# Patient Record
Sex: FEMALE | Race: BLACK OR AFRICAN AMERICAN | Employment: FULL TIME | URBAN - METROPOLITAN AREA
[De-identification: names, ages, dates, MRNs, and addresses within clinical notes are randomized per-mention and may not be internally consistent; named-entity substitution may affect disease eponyms.]

---

## 2017-02-21 PROCEDURE — 88305 TISSUE EXAM BY PATHOLOGIST: CPT | Performed by: OBSTETRICS & GYNECOLOGY

## 2017-02-22 ENCOUNTER — HOSPITAL ENCOUNTER (OUTPATIENT)
Dept: LAB | Age: 32
Discharge: HOME | End: 2017-02-22

## 2017-02-27 NOTE — PROGRESS NOTES
Patient notified of BX results and lab results. She will just need to repeat her pap smear in August. Appt made and she can call back if needed.

## 2017-11-07 PROCEDURE — 88305 TISSUE EXAM BY PATHOLOGIST: CPT | Performed by: OBSTETRICS & GYNECOLOGY

## 2017-11-08 ENCOUNTER — HOSPITAL ENCOUNTER (OUTPATIENT)
Dept: LAB | Age: 32
Discharge: HOME | End: 2017-11-08

## 2017-11-13 NOTE — PROGRESS NOTES
Patient notified of path results and she needs a LEEP. Appt made [email or web address]. Advised to take Advil prior to procedure.

## 2018-07-30 PROCEDURE — 88307 TISSUE EXAM BY PATHOLOGIST: CPT | Performed by: OBSTETRICS & GYNECOLOGY

## 2018-07-31 ENCOUNTER — HOSPITAL ENCOUNTER (OUTPATIENT)
Dept: LAB | Age: 33
Discharge: HOME | End: 2018-07-31

## 2019-01-29 ENCOUNTER — HOSPITAL ENCOUNTER (EMERGENCY)
Age: 34
Discharge: HOME | End: 2019-01-29
Payer: COMMERCIAL

## 2019-01-29 VITALS
TEMPERATURE: 98.4 F | WEIGHT: 182 LBS | OXYGEN SATURATION: 99 % | HEART RATE: 76 BPM | HEIGHT: 67 IN | SYSTOLIC BLOOD PRESSURE: 99 MMHG | DIASTOLIC BLOOD PRESSURE: 56 MMHG | BODY MASS INDEX: 28.56 KG/M2 | RESPIRATION RATE: 18 BRPM

## 2019-01-29 DIAGNOSIS — R07.89 ATYPICAL CHEST PAIN: ICD-10-CM

## 2019-01-29 DIAGNOSIS — F43.9 STRESS AT HOME: Primary | ICD-10-CM

## 2019-01-29 LAB
ALBUMIN SERPL-MCNC: 3.6 G/DL (ref 3.5–5)
ALBUMIN/GLOB SERPL: 0.8 {RATIO} (ref 1.2–3.5)
ALP SERPL-CCNC: 68 U/L (ref 50–136)
ALT SERPL-CCNC: 19 U/L (ref 12–65)
ANION GAP SERPL CALC-SCNC: 8 MMOL/L (ref 7–16)
AST SERPL-CCNC: 14 U/L (ref 15–37)
ATRIAL RATE: 97 BPM
BASOPHILS # BLD: 0 K/UL (ref 0–0.2)
BASOPHILS NFR BLD: 0 % (ref 0–2)
BILIRUB SERPL-MCNC: 0.7 MG/DL (ref 0.2–1.1)
BUN SERPL-MCNC: 9 MG/DL (ref 6–23)
CALCIUM SERPL-MCNC: 8.9 MG/DL (ref 8.3–10.4)
CALCULATED P AXIS, ECG09: 75 DEGREES
CALCULATED R AXIS, ECG10: 59 DEGREES
CALCULATED T AXIS, ECG11: 28 DEGREES
CHLORIDE SERPL-SCNC: 104 MMOL/L (ref 98–107)
CO2 SERPL-SCNC: 26 MMOL/L (ref 21–32)
CREAT SERPL-MCNC: 0.91 MG/DL (ref 0.6–1)
D DIMER PPP FEU-MCNC: <0.27 UG/ML(FEU)
DIAGNOSIS, 93000: NORMAL
DIFFERENTIAL METHOD BLD: NORMAL
EOSINOPHIL # BLD: 0.1 K/UL (ref 0–0.8)
EOSINOPHIL NFR BLD: 2 % (ref 0.5–7.8)
ERYTHROCYTE [DISTWIDTH] IN BLOOD BY AUTOMATED COUNT: 12 % (ref 11.9–14.6)
GLOBULIN SER CALC-MCNC: 4.5 G/DL (ref 2.3–3.5)
GLUCOSE SERPL-MCNC: 109 MG/DL (ref 65–100)
HCT VFR BLD AUTO: 41 % (ref 35.8–46.3)
HGB BLD-MCNC: 14.1 G/DL (ref 11.7–15.4)
IMM GRANULOCYTES # BLD AUTO: 0 K/UL (ref 0–0.5)
IMM GRANULOCYTES NFR BLD AUTO: 1 % (ref 0–5)
LYMPHOCYTES # BLD: 1.8 K/UL (ref 0.5–4.6)
LYMPHOCYTES NFR BLD: 23 % (ref 13–44)
MCH RBC QN AUTO: 32.3 PG (ref 26.1–32.9)
MCHC RBC AUTO-ENTMCNC: 34.4 G/DL (ref 31.4–35)
MCV RBC AUTO: 93.8 FL (ref 79.6–97.8)
MONOCYTES # BLD: 0.5 K/UL (ref 0.1–1.3)
MONOCYTES NFR BLD: 7 % (ref 4–12)
NEUTS SEG # BLD: 5.3 K/UL (ref 1.7–8.2)
NEUTS SEG NFR BLD: 68 % (ref 43–78)
NRBC # BLD: 0 K/UL (ref 0–0.2)
P-R INTERVAL, ECG05: 144 MS
PLATELET # BLD AUTO: 189 K/UL (ref 150–450)
PMV BLD AUTO: 11.6 FL (ref 9.4–12.3)
POTASSIUM SERPL-SCNC: 3.9 MMOL/L (ref 3.5–5.1)
PROT SERPL-MCNC: 8.1 G/DL (ref 6.3–8.2)
Q-T INTERVAL, ECG07: 346 MS
QRS DURATION, ECG06: 84 MS
QTC CALCULATION (BEZET), ECG08: 439 MS
RBC # BLD AUTO: 4.37 M/UL (ref 4.05–5.2)
SODIUM SERPL-SCNC: 138 MMOL/L (ref 136–145)
TROPONIN I BLD-MCNC: 0 NG/ML (ref 0.02–0.05)
TROPONIN I BLD-MCNC: 0.02 NG/ML (ref 0.02–0.05)
VENTRICULAR RATE, ECG03: 97 BPM
WBC # BLD AUTO: 7.8 K/UL (ref 4.3–11.1)

## 2019-01-29 PROCEDURE — 99284 EMERGENCY DEPT VISIT MOD MDM: CPT

## 2019-01-29 PROCEDURE — 85025 COMPLETE CBC W/AUTO DIFF WBC: CPT

## 2019-01-29 PROCEDURE — 84484 ASSAY OF TROPONIN QUANT: CPT

## 2019-01-29 PROCEDURE — 93005 ELECTROCARDIOGRAM TRACING: CPT | Performed by: EMERGENCY MEDICINE

## 2019-01-29 PROCEDURE — 85379 FIBRIN DEGRADATION QUANT: CPT

## 2019-01-29 PROCEDURE — 80053 COMPREHEN METABOLIC PANEL: CPT

## 2019-01-29 RX ORDER — ALPRAZOLAM 1 MG/1
0.5 TABLET ORAL
Qty: 8 TAB | Refills: 0 | Status: SHIPPED | OUTPATIENT
Start: 2019-01-29 | End: 2019-02-06 | Stop reason: ALTCHOICE

## 2019-01-29 NOTE — ED NOTES
I have reviewed discharge instructions with the patient. The patient verbalized understanding. Patient left ED via Discharge Method: ambulatory to Home with self. Opportunity for questions and clarification provided. Patient given 0 scripts. To continue your aftercare when you leave the hospital, you may receive an automated call from our care team to check in on how you are doing. This is a free service and part of our promise to provide the best care and service to meet your aftercare needs.  If you have questions, or wish to unsubscribe from this service please call [phone]. Thank you for Choosing our [facility] Emergency Department.

## 2019-01-29 NOTE — ED PROVIDER NOTES
31-year-old female complaining of chest pressure. Patient has been under a lot of stress at home lately. Patient was at work today and continued to have chest pain. This is been going on all for several weeks. The history is provided by the patient. Chest Pain This is a new problem. The current episode started more than 2 days ago. The problem has not changed since onset. The pain is associated with stress. The pain is mild. The quality of the pain is described as heavy. The pain does not radiate. Pertinent negatives include no diaphoresis and no shortness of breath. She has tried nothing for the symptoms. Past Medical History:  
Diagnosis Date  Abnormal Pap smear of cervix LGSIL Past Surgical History:  
Procedure Laterality Date  HX COLPOSCOPY    
 2017 (LGSIL) Family History:  
Problem Relation Age of Onset  Prostate Cancer Maternal Grandfather  Prostate Cancer Paternal Grandfather Social History Socioeconomic History  Marital status: SINGLE Spouse name: Not on file  Number of children: Not on file  Years of education: Not on file  Highest education level: Not on file Social Needs  Financial resource strain: Not on file  Food insecurity - worry: Not on file  Food insecurity - inability: Not on file  Transportation needs - medical: Not on file  Transportation needs - non-medical: Not on file Occupational History  Not on file Tobacco Use  Smoking status: Never Smoker  Smokeless tobacco: Never Used Substance and Sexual Activity  Alcohol use: Yes Comment: occasionally  Drug use: No  
 Sexual activity: Yes Birth control/protection: IUD Comment: mirena 2015 Other Topics Concern [address] Service Not Asked  Blood Transfusions Not Asked  Caffeine Concern Yes  Occupational Exposure Not Asked [place] Hazards Not Asked  Sleep Concern Not Asked  Stress Concern Not Asked  Weight Concern Not Asked  Special Diet Not Asked  Back Care Not Asked  Exercise No  
 Bike Helmet Not Asked [address] [facility] Yes  Self-Exams No  
Social History Narrative Denies any sexual or physical abuse and feels safe at home. ALLERGIES: Patient has no known allergies. Review of Systems Constitutional: Negative. Negative for activity change and diaphoresis. HENT: Negative. Eyes: Negative. Respiratory: Negative. Negative for shortness of breath. Cardiovascular: Positive for chest pain. Gastrointestinal: Negative. Genitourinary: Negative. Musculoskeletal: Negative. Skin: Negative. Neurological: Negative. Psychiatric/Behavioral: Negative. All other systems reviewed and are negative. Vitals:  
 01/29/19 1132 BP: 127/76 Pulse: 90 Resp: 18 Temp: 98.4 °F (36.9 °C) SpO2: 98% Weight: 82.6 kg (182 lb) Height: 5' 7\" (1.702 m) Physical Exam  
Constitutional: She is oriented to person, place, and time. She appears well-developed and well-nourished. No distress. HENT:  
Head: Normocephalic and atraumatic. Right Ear: External ear normal.  
Left Ear: External ear normal.  
Nose: Nose normal.  
Mouth/Throat: Oropharynx is clear and moist. No oropharyngeal exudate. Eyes: Conjunctivae and EOM are normal. Pupils are equal, round, and reactive to light. Right eye exhibits no discharge. Left eye exhibits no discharge. No scleral icterus. Neck: Normal range of motion. Neck supple. No JVD present. No tracheal deviation present. Cardiovascular: Normal rate, regular rhythm and intact distal pulses. Pulmonary/Chest: Effort normal and breath sounds normal. No stridor. No respiratory distress. She has no wheezes. She exhibits no tenderness. Abdominal: Soft. Bowel sounds are normal. She exhibits no distension and no mass. There is no tenderness. Musculoskeletal: Normal range of motion. She exhibits no edema or tenderness. Neurological: She is alert and oriented to person, place, and time. No cranial nerve deficit. Skin: Skin is warm and dry. No rash noted. She is not diaphoretic. No erythema. No pallor. Psychiatric: She has a normal mood and affect. Her behavior is normal. Thought content normal.  
Nursing note and vitals reviewed. MDM Number of Diagnoses or Management Options Diagnosis management comments: Patient is under a lot of stress cardiac workup is negative P workup negative will send her home for follow-up with primary care physician started her on some Zantac and TB Amount and/or Complexity of Data Reviewed Clinical lab tests: ordered and reviewed Tests in the radiology section of CPT®: ordered and reviewed Tests in the medicine section of CPT®: ordered and reviewed Risk of Complications, Morbidity, and/or Mortality Presenting problems: high Diagnostic procedures: high Management options: high Patient Progress Patient progress: stable Procedures

## 2019-01-29 NOTE — LETTER
[address] [facility] EMERGENCY DEPT [address] [address] 
[phone] Work/School Note Date: 1/29/2019 To Whom It May concern: 
 
Andreea Martini was seen and treated today in the emergency room by the following provider(s): 
Attending Provider: James Tinoco MD.   
 
Andreea Martini may return to work on 1/31/18. Sincerely, Edward Coates

## 2019-01-29 NOTE — ED TRIAGE NOTES
Patient arrives from work stating CP x4 days with associated SOB. Rates pain 5/10. Describes it as pressure and unable to take a full deep breath when the pain occurs. States she isn't sure if it's stress and anxiety. States she has a lot going on. Worse at night and isn't sleeping well. Denies taking medications. Patient alert and oriented.

## 2019-01-29 NOTE — DISCHARGE INSTRUCTIONS
Patient Education        Learning About Stress  What is stress? Stress is what you feel when you have to handle more than you are used to. Stress is a fact of life for most people, and it affects everyone differently. What causes stress for you may not be stressful for someone else. A lot of things can cause stress. You may feel stress when you go on a job interview, take a test, or run a race. This kind of short-term stress is normal and even useful. It can help you if you need to work hard or react quickly. For example, stress can help you finish an important job on time. Stress also can last a long time. Long-term stress is caused by stressful situations or events. Examples of long-term stress include long-term health problems, ongoing problems at work, or conflicts in your family. Long-term stress can harm your health. How does stress affect your health? When you are stressed, your body responds as though you are in danger. It makes hormones that speed up your heart, make you breathe faster, and give you a burst of energy. This is called the fight-or-flight stress response. If the stress is over quickly, your body goes back to normal and no harm is done. But if stress happens too often or lasts too long, it can have bad effects. Long-term stress can make you more likely to get sick, and it can make symptoms of some diseases worse. If you tense up when you are stressed, you may develop neck, shoulder, or low back pain. Stress is linked to high blood pressure and heart disease. Stress also harms your emotional health. It can make you hernandez, tense, or depressed. Your relationships may suffer, and you may not do well at work or school. What can you do to manage stress? How to relax your mind  · Write. It may help to write about things that are bothering you. This helps you find out how much stress you feel and what is causing it. When you know this, you can find better ways to cope.   · Let your feelings out. Talk, laugh, cry, and express anger when you need to. Talking with friends, family, a counselor, or a member of the clergy about your feelings is a healthy way to relieve stress. · Do something you enjoy. For example, listen to music or go to a movie. Practice your hobby or do volunteer work. · Meditate. This can help you relax, because you are not worrying about what happened before or what may happen in the future. · Do guided imagery. Imagine yourself in any setting that helps you feel calm. You can use audiotapes, books, or a teacher to guide you. How to relax your body  · Do something active. Exercise or activity can help reduce stress. Walking is a great way to get started. Even everyday activities such as housecleaning or yard work can help. · Do breathing exercises. For example:  ? From a standing position, bend forward from the waist with your knees slightly bent. Let your arms dangle close to the floor. ? Breathe in slowly and deeply as you return to a standing position. Roll up slowly and lift your head last.  ? Hold your breath for just a few seconds in the standing position. ? Breathe out slowly and bend forward from the waist.  · Try yoga or jannet chi. These techniques combine exercise and meditation. You may need some training at first to learn them. What can you do to prevent stress? · Manage your time. This helps you find time to do the things you want and need to do. · Get enough sleep. Your body recovers from the stresses of the day while you are sleeping. · Get support. Your family, friends, and community can make a difference in how you experience stress. Where can you learn more? Go to http://praneeth-emeterio.info/. Enter O786 in the search box to learn more about \"Learning About Stress. \"  Current as of: June 28, 2018  Content Version: 11.9  © 5686-5474 [employer].  Care instructions adapted under license by [employer] (which disclaims liability or warranty for this information). If you have questions about a medical condition or this instruction, always ask your healthcare professional. [place] [address] any warranty or liability for your use of this information. Patient Education        Musculoskeletal Chest Pain: Care Instructions  Your Care Instructions    Chest pain is not always a sign that something is wrong with your heart or that you have another serious problem. The doctor thinks your chest pain is caused by strained muscles or ligaments, inflamed chest cartilage, or another problem in your chest, rather than by your heart. You may need more tests to find the cause of your chest pain. Follow-up care is a key part of your treatment and safety. Be sure to make and go to all appointments, and call your doctor if you are having problems. It's also a good idea to know your test results and keep a list of the medicines you take. How can you care for yourself at home? · Take pain medicines exactly as directed. ? If the doctor gave you a prescription medicine for pain, take it as prescribed. ? If you are not taking a prescription pain medicine, ask your doctor if you can take an over-the-counter medicine. · Rest and protect the sore area. · Stop, change, or take a break from any activity that may be causing your pain or soreness. · Put ice or a cold pack on the sore area for 10 to 20 minutes at a time. Try to do this every 1 to 2 hours for the next 3 days (when you are awake) or until the swelling goes down. Put a thin cloth between the ice and your skin. · After 2 or 3 days, apply a heating pad set on low or a warm cloth to the area that hurts. Some doctors suggest that you go back and forth between hot and cold. · Do not wrap or tape your ribs for support. This may cause you to take smaller breaths, which could increase your risk of lung problems.   · Mentholated creams such as Bengay or Icy Hot may soothe sore muscles. Follow the instructions on the package. · Follow your doctor's instructions for exercising. · Gentle stretching and massage may help you get better faster. Stretch slowly to the point just before pain begins, and hold the stretch for at least 15 to 30 seconds. Do this 3 or 4 times a day. Stretch just after you have applied heat. · As your pain gets better, slowly return to your normal activities. Any increased pain may be a sign that you need to rest a while longer. When should you call for help? Call 911 anytime you think you may need emergency care. For example, call if:    · You have chest pain or pressure. This may occur with:  ? Sweating. ? Shortness of breath. ? Nausea or vomiting. ? Pain that spreads from the chest to the neck, jaw, or one or both shoulders or arms. ? Dizziness or lightheadedness. ? A fast or uneven pulse. After calling 911, chew 1 adult-strength aspirin. Wait for an ambulance. Do not try to drive yourself.     · You have sudden chest pain and shortness of breath, or you cough up blood.    Call your doctor now or seek immediate medical care if:    · You have any trouble breathing.     · Your chest pain gets worse.     · Your chest pain occurs consistently with exercise and is relieved by rest.    Watch closely for changes in your health, and be sure to contact your doctor if:    · Your chest pain does not get better after 1 week. Where can you learn more? Go to http://praneeth-emeterio.info/. Enter V293 in the search box to learn more about \"Musculoskeletal Chest Pain: Care Instructions. \"  Current as of: September 23, 2018  Content Version: 11.9  © 6760-9117 [employer]. Care instructions adapted under license by [employer] (which disclaims liability or warranty for this information).  If you have questions about a medical condition or this instruction, always ask your healthcare professional. Pao Miner disclaims any warranty or liability for your use of this information.

## 2019-01-30 ENCOUNTER — PATIENT OUTREACH (OUTPATIENT)
Dept: OTHER | Age: 34
End: 2019-01-30

## 2019-01-30 NOTE — PROGRESS NOTES
HPRP progress note Patient eligible for [facility] care management Received notification of discharge from [facility] ED on 1/29/19 for Stress at Home. Contacted patient to discuss post discharge needs and offer care management services. Two patient identifiers verified. Discussed the care management program.  Patient agrees to care management services at this time. PMH:  
Past Medical History:  
Diagnosis Date  Abnormal Pap smear of cervix LGSIL Social History:  
Social History Socioeconomic History  Marital status: SINGLE Spouse name: Not on file  Number of children: Not on file  Years of education: Not on file  Highest education level: Not on file Social Needs  Financial resource strain: Not on file  Food insecurity - worry: Not on file  Food insecurity - inability: Not on file  Transportation needs - medical: Not on file  Transportation needs - non-medical: Not on file Occupational History  Not on file Tobacco Use  Smoking status: Never Smoker  Smokeless tobacco: Never Used Substance and Sexual Activity  Alcohol use: Yes Comment: occasionally  Drug use: No  
 Sexual activity: Yes Birth control/protection: IUD Comment: mirena 2015 Other Topics Concern [address] Service Not Asked  Blood Transfusions Not Asked  Caffeine Concern Yes  Occupational Exposure Not Asked [place] [place] Hazards Not Asked  Sleep Concern Not Asked  Stress Concern Not Asked  Weight Concern Not Asked  Special Diet Not Asked  Back Care Not Asked  Exercise No  
 Bike Helmet Not Asked [address] Yes  Self-Exams No  
Social History Narrative Denies any sexual or physical abuse and feels safe at home. Care management assessment completed: 
*Spoke with patient who reports that she is doing better.  
  -States that she is under a lot of stress at home. Carmen Abernathy took the day off today to rest and get her mind clear. Will return to work tomorrow. 
  -States that Xanax has helped a little with her anxiety. 
 
  -Denies any Red Flag Symptoms at this time. *Patient does not have a PCP - offered to find one- patient agreed. -ED referred her to a PCP. *Made call to [employer] Patient Appt. February 6, 2019 2:20p. 
  -Informed patient to bring Atrica, Photo ID, List of Medications. Patient verbalized understanding. *Educated patient on some techniques to help with her stress and anxiety. Informed patient of resources that [employer] has if needed. 
 
  -Patient would like to wait until she sees Provider to explore those resources. Will f/u on next call. Red Flags: 
Call 911 anytime you think you may need emergency care. For example, call if:  
You have chest pain or pressure. This may occur with: 
?Sweating. ? Shortness of breath. Nausea or vomiting. ?Pain that spreads from the chest to the neck, jaw, or one or both shoulders or arms. ?Dizziness or lightheadedness. ?A fast or uneven pulse. After calling 911, chew 1 adult-strength aspirin. Wait for an ambulance. Do not try to drive yourself. You have sudden chest pain and shortness of breath, or you cough up blood. Call your doctor now or seek immediate medical care if: 
You have any trouble breathing. Your chest pain gets worse. Your chest pain occurs consistently with exercise and is relieved by rest. 
Your chest pain does not get better after 1 week. Medications: 
New Medications at Discharge: ALPRAZolam 1 mg tablet Changed Medications at Discharge: None Noted Discontinued Medications at Discharge: None Noted Current Outpatient Medications Medication Sig  ALPRAZolam (XANAX) 1 mg tablet Take 0.5 Tabs by mouth every eight (8) hours as needed for Anxiety.  Max Daily Amount: 1.5 mg.  
  metroNIDAZOLE (FLAGYL) 250 mg tablet Take 1 Tab by mouth three (3) times daily.  fluconazole (DIFLUCAN) 150 mg tablet 1 now and 1 in 3 days  tretinoin (RETIN-A) 0.025 % topical cream APPLY PEA SIZED AMOUNT ONCE DAILY TO AFFECTED AREAS AT BEDTIME TO DRY SKIN  
 levonorgestrel (MIRENA) 20 mcg/24 hr (5 years) IUD 1 Each by IntraUTERine route once. No current facility-administered medications for this visit. There are no discontinued medications. Performed medication reconciliation with patient, and patient verbalizes understanding of administration of home medications. There were no barriers to obtaining medications identified at this time. Preventative Care Health Maintenance Topic Date Due  Influenza Age 5 to Adult  08/01/2018  PAP AKA CERVICAL CYTOLOGY  04/04/2021  
 DTaP/Tdap/Td series (2 - Td) 09/09/2025 CM Identified  Problems 1. Ineffective Coping/Stress 2. Knowledge and adherence of medication Goals Patient will be able to effectively learn how to alleviate those stressors that are causing her anxiety. Patient will be knowledgeable regarding new medications, action, side effects, missed dose, etc. 
 
 
Barriers/Support system: 
patient and other:  relative Barriers/Challenges to Care  []  Decline in memory    []  Language barrier [x]  Emotional                  []  Limited mobility 
[]  Lack of motivation     [] Vision, hearing or cognitive impairment [x]  Knowledge [] Financial Barriers []  Lack of support  []  Pain []  Other []  None PCP/Specialist follow up: Patient scheduled to follow up with - see appointment below. Future Appointments Date Time Provider José Miguel Garcia 2/6/2019  2:20 PM Terrance Machado NP [facility] Reviewed red flags with patient, and patient verbalizes understanding. Patient given an opportunity to ask questions. No other clinical/social/functional needs noted. The patient agrees to contact the PCP office for questions related to their healthcare. The patient expressed thanks, offered no additional questions and ended the call. Plan for next call: 2/14/19 f/u - New PCP Appt Ace Wallace

## 2019-02-06 PROBLEM — R87.619 ABNORMAL CERVICAL PAPANICOLAOU SMEAR: Status: ACTIVE | Noted: 2019-02-06

## 2019-02-06 PROBLEM — F41.8 ANXIETY WITH DEPRESSION: Status: ACTIVE | Noted: 2019-02-06

## 2019-02-07 PROBLEM — R94.6 BORDERLINE ABNORMAL THYROID FUNCTION TEST: Status: ACTIVE | Noted: 2019-02-07

## 2019-02-08 PROBLEM — E05.90 SUBCLINICAL HYPERTHYROIDISM: Status: ACTIVE | Noted: 2019-02-08

## 2019-02-11 ENCOUNTER — HOSPITAL ENCOUNTER (OUTPATIENT)
Dept: ULTRASOUND IMAGING | Age: 34
Discharge: HOME | End: 2019-02-11
Attending: NURSE PRACTITIONER
Payer: COMMERCIAL

## 2019-02-11 DIAGNOSIS — E05.90 SUBCLINICAL HYPERTHYROIDISM: ICD-10-CM

## 2019-02-11 DIAGNOSIS — R94.6 BORDERLINE ABNORMAL THYROID FUNCTION TEST: ICD-10-CM

## 2019-02-11 PROCEDURE — 76536 US EXAM OF HEAD AND NECK: CPT

## 2019-02-11 NOTE — PROGRESS NOTES
No cysts or nodules seen. Thyroid gland uniformly/mildly enlarged. Continue with POC as discussed and keep f/u. Please fax this U/S to Endocrine and check on appt time.

## 2019-02-12 NOTE — PROGRESS NOTES
Pt notified about lab result and verbalized understanding.  Pt has appointment with Endocrine on 03/06/19

## 2019-02-14 ENCOUNTER — PATIENT OUTREACH (OUTPATIENT)
Dept: OTHER | Age: 34
End: 2019-02-14

## 2019-02-14 NOTE — PROGRESS NOTES
HPRP f/u: 
Telephone attempt to contact patient for Health Promotion and Risk Prevention. Left discreet message on voicemail with this CC contact information. Will follow for one month for transitions of care needs. Next outreach is 3/4/19 for discussion f/u - PCP,  Appts and Resolve Epsiode Chart Review: 
 
Melodi Landau, NP 2/6/19 [address] Patient  here today to establish primary care/ER f/u for CP/anxiety 1/29/19. The pt denies having a recent PCP. The pt reports having a hx of an abnormal PAP-this issue is managed by Dr. Claire Nathan- The pt had an ER visit on 1/29/19 for chest pain. ER workup was negative-pt had normal CBC, CMP, D-dimer, Troponin x 2, and EKG. ER provider felt that chest pain was anxiety related-see below. The pt denies having any further chest pain since being in ER. Pt appears mildly anxious/depressed, but does not appear overly panicked or manic. Mild flat affect, but will make eye contact. Answers questions appropriately. Pt is cooperative. Pt denies any SI, HI, hallucinations ASSESSMENT and PLAN 1. Anxiety with depression 
 
escitalopram oxalate (LEXAPRO) 10 mg tablet; Take 1 Tab by mouth daily. Dispense: 90 Tab; Refill: 1 
- hydrOXYzine HCl (ATARAX) 25 mg tablet; 0.5-1 tab po tid PRN anxiety. Watch for sedation  Dispense: 90 Tab; Refill: 1 
- REFERRAL TO BEHAVIORAL HEALTH Pt to f/u with me in 2 weeks to recheck anxiety and depression (2/20/19). Pt agrees to call sooner for concerns/new or worsening symptoms as discussed. Pt arranged to start counseling with [facility] at next available (2/27/19).

## 2019-02-27 ENCOUNTER — HOSPITAL ENCOUNTER (OUTPATIENT)
Dept: NUCLEAR MEDICINE | Age: 34
Discharge: HOME | End: 2019-02-27
Attending: NURSE PRACTITIONER
Payer: COMMERCIAL

## 2019-02-27 DIAGNOSIS — E05.90 SUBCLINICAL HYPERTHYROIDISM: ICD-10-CM

## 2019-02-27 DIAGNOSIS — R94.6 BORDERLINE ABNORMAL THYROID FUNCTION TEST: ICD-10-CM

## 2019-02-27 PROCEDURE — 78014 THYROID IMAGING W/BLOOD FLOW: CPT

## 2019-02-28 ENCOUNTER — HOSPITAL ENCOUNTER (OUTPATIENT)
Dept: NUCLEAR MEDICINE | Age: 34
Discharge: HOME | End: 2019-02-28
Attending: NURSE PRACTITIONER
Payer: COMMERCIAL

## 2019-03-04 ENCOUNTER — PATIENT OUTREACH (OUTPATIENT)
Dept: OTHER | Age: 34
End: 2019-03-04

## 2019-03-04 NOTE — PROGRESS NOTES
Final call made today. Telephone attempt to contact patient for Transitions of Care. Left discreet message on voicemail with this CC contact information. Resolving current episode for case management due to patient lost to follow up. Patient has not been reached after repeated calls and letters. Final call made today to attempt to contact and discreet message left on voicemail. Letter sent to patient notifying completion of services due to unable to reach. This writer's contact information and information regarding program services included in materials sent. Goals updated to reflect current status as appropriate. Will remain available should patient request re-initiation of case management or transitions of care services. Chart Review: 
Maribel Damon, NP - 2/20/19 F/u to recheck anxiety/depression and discuss thyroid labs and U/S. Pt taking Lexapro 10 mg po daily as directed. Has seen slight improvement in her anxiety and depression. Feels moods are less labile. Has not had any panic attacks since last appt and denies any new or worsening symptoms. The pt had a suppressed TSH of 0.320, but normal FT4 of 1.22 on 2/6/19. TFT's were rechecked on 2/7/19 to confirm and pt again had a suppressed TSH of 0.281, but normal FT4 of 1.19. Pt had an elevated Thyroglobulin antibody of 7.1 on 2/7/19, but Thyroid peroxidase antibody was normal. Likely indicating an autoimmune thyroiditis. The pt had a thyroid U/S on 2/11/19 that revealed that the thyroid gland was uniformly/mildly enlarged, but no nodules or cysts seen. The pt is scheduled for a thyroid uptake and scan on 2/27/19 and to see Endocrine-Dr. Jeferson Chisholm 3/6/19 (is on cancellation list to get sooner appt if possible Abnormal cervical Papanicolaou smear, unspecified abnormal pap finding The pt has a hx of HGSIL. Had colposcopy with Dr. Lorranie Bailey at [facility] 11/17/17. PAP on 4/4/18 showed LGSIL.  Had LEEP with  Sia Billings 7/30/18. Pt was supposed to f/u for PAP in 6 mos-overdue. Pt agrees to call asap to make appt for repeat PAP.  
 
Pt to f/u with me in 3 weeks to recheck anxiety/depression and to discuss findings of Endocrine (3/27/19). Pt agrees to call sooner for new/worsening symptoms. Pt agrees to go to ER for severe symptoms as discussed.

## 2019-03-04 NOTE — LETTER
3/4/2019 1:15 PM 
 
Ms. Jesika London [address] [facility] [address] [facility] [address] Dear Jesika Hermelindaandrey My name is Anthony Casey,  Employee Care Coordinator for [facility], and I have been trying to reach you. The Employee Care Management [facility]) program is a free-of-charge, confidential service provided to our employees and their family members covered by the [facility]. I can help you with care transitions such as when you come home from the hospital, when help is needed to manage your disease, or when you need assistance coordinating services or appointments. [facility] now partners with [facility]. If you are a qualifying employee, you may receive an additional 10 wellness incentive points for every month of active participation with an Employee Care Manager. As healthcare providers, we know that patients do better when they have close follow up with a primary care provider (PCP). I can help you find one that is convenient to you and covered by your insurance. I can also help you understand any after visit instructions, such as what symptoms to watch out for, or any new or changed medications. We can work together using your preferred communication -- telephone, email, [employer]. If you do not have a [employer] account, I can help you request access. Our program is designed to provide you with the opportunity to have a [facility] care manager partner with you for your healthcare needs. Due to not being able to reach you, I am closing out the current program, but will remain available to you should you have any questions. Please contact me at the below number if I can provide you with assistance for any of the above services. Sincerely, 
 
Flores Maya LPN  [facility] Care Coordinator [address] [place] [address], [address] [place], [place] [address]y [address] [facility] [phone]  F [phone]  [email or web address]